# Patient Record
Sex: MALE | Race: WHITE | NOT HISPANIC OR LATINO | Employment: FULL TIME | URBAN - METROPOLITAN AREA
[De-identification: names, ages, dates, MRNs, and addresses within clinical notes are randomized per-mention and may not be internally consistent; named-entity substitution may affect disease eponyms.]

---

## 2021-01-15 ENCOUNTER — OFFICE VISIT (OUTPATIENT)
Dept: URGENT CARE | Facility: CLINIC | Age: 46
End: 2021-01-15

## 2021-01-15 VITALS
HEART RATE: 106 BPM | TEMPERATURE: 97.8 F | OXYGEN SATURATION: 97 % | DIASTOLIC BLOOD PRESSURE: 82 MMHG | RESPIRATION RATE: 16 BRPM | SYSTOLIC BLOOD PRESSURE: 110 MMHG | HEIGHT: 70 IN | WEIGHT: 240 LBS | BODY MASS INDEX: 34.36 KG/M2

## 2021-01-15 DIAGNOSIS — E11.65 UNCONTROLLED TYPE 2 DIABETES MELLITUS WITH HYPERGLYCEMIA (HCC): ICD-10-CM

## 2021-01-15 DIAGNOSIS — R73.9 HYPERGLYCEMIA: ICD-10-CM

## 2021-01-15 DIAGNOSIS — R11.0 NAUSEA: ICD-10-CM

## 2021-01-15 LAB
APPEARANCE UR: CLEAR
BILIRUB UR STRIP-MCNC: NORMAL MG/DL
COLOR UR AUTO: NORMAL
GLUCOSE BLD-MCNC: 296 MG/DL (ref 70–100)
GLUCOSE UR STRIP.AUTO-MCNC: 500 MG/DL
KETONES UR STRIP.AUTO-MCNC: NORMAL MG/DL
LEUKOCYTE ESTERASE UR QL STRIP.AUTO: NORMAL
NITRITE UR QL STRIP.AUTO: NORMAL
PH UR STRIP.AUTO: 5 [PH] (ref 5–8)
PROT UR QL STRIP: NORMAL MG/DL
RBC UR QL AUTO: NORMAL
SP GR UR STRIP.AUTO: >=1.03
UROBILINOGEN UR STRIP-MCNC: 4 MG/DL

## 2021-01-15 PROCEDURE — 82962 GLUCOSE BLOOD TEST: CPT | Performed by: PHYSICIAN ASSISTANT

## 2021-01-15 PROCEDURE — 81002 URINALYSIS NONAUTO W/O SCOPE: CPT | Performed by: PHYSICIAN ASSISTANT

## 2021-01-15 PROCEDURE — 99203 OFFICE O/P NEW LOW 30 MIN: CPT | Performed by: PHYSICIAN ASSISTANT

## 2021-01-15 RX ORDER — ONDANSETRON 4 MG/1
4 TABLET, ORALLY DISINTEGRATING ORAL ONCE
Status: COMPLETED | OUTPATIENT
Start: 2021-01-15 | End: 2021-01-15

## 2021-01-15 RX ORDER — LISINOPRIL 20 MG/1
20 TABLET ORAL DAILY
COMMUNITY

## 2021-01-15 RX ORDER — GLIPIZIDE 10 MG/1
10 TABLET ORAL 2 TIMES DAILY
COMMUNITY

## 2021-01-15 RX ADMIN — ONDANSETRON 4 MG: 4 TABLET, ORALLY DISINTEGRATING ORAL at 12:20

## 2021-01-15 ASSESSMENT — ENCOUNTER SYMPTOMS
NAUSEA: 1
DIARRHEA: 0
CHILLS: 0
HEADACHES: 1
SHORTNESS OF BREATH: 0
COUGH: 0
CHANGE IN BOWEL HABIT: 0
ABDOMINAL PAIN: 0
DIZZINESS: 0
FEVER: 0
FATIGUE: 1
VOMITING: 1
MYALGIAS: 1

## 2021-01-16 NOTE — PROGRESS NOTES
"Subjective:      Matty Smallwood is a 45 y.o. male who presents with Nausea (Frequent nausea, pt states Sx started appriximately 3 weeks ago. )            Nausea  This is a new problem. The current episode started in the past 7 days. Associated symptoms include fatigue, headaches, myalgias, nausea and vomiting. Pertinent negatives include no abdominal pain, change in bowel habit, chest pain, chills, congestion, coughing, fever, rash or urinary symptoms. Nothing aggravates the symptoms. He has tried nothing for the symptoms.     Patient presents to urgent care reporting a 3 week history of nausea, vomiting, fatigue, and headaches. No fevers, chills, body aches, abdominal pain, constipation, diarrhea, or urinary symptoms. PMH is significant for diabetes, currently taking glipizide 20 mg BID and pioglitazone 15 mg daily. He states he is usually compliant with taking the medications, but hasn't taken either medication today.       Review of Systems   Constitutional: Positive for fatigue. Negative for chills and fever.   HENT: Negative for congestion.    Respiratory: Negative for cough and shortness of breath.    Cardiovascular: Negative for chest pain.   Gastrointestinal: Positive for nausea and vomiting. Negative for abdominal pain, change in bowel habit and diarrhea.   Genitourinary: Negative.    Musculoskeletal: Positive for myalgias.   Skin: Negative for rash.   Neurological: Positive for headaches. Negative for dizziness.        Objective:     /82   Pulse (!) 106   Temp 36.6 °C (97.8 °F) (Temporal)   Resp 16   Ht 1.778 m (5' 10\")   Wt 108.9 kg (240 lb)   SpO2 97%   BMI 34.44 kg/m²      Physical Exam  Vitals signs and nursing note reviewed.   Constitutional:       Appearance: Normal appearance. He is well-developed.   HENT:      Head: Normocephalic and atraumatic.      Right Ear: External ear normal.      Left Ear: External ear normal.   Eyes:      Conjunctiva/sclera: Conjunctivae normal.   Neck: "      Musculoskeletal: Normal range of motion.   Cardiovascular:      Rate and Rhythm: Normal rate and regular rhythm.      Heart sounds: Normal heart sounds. No murmur.   Pulmonary:      Effort: Pulmonary effort is normal.      Breath sounds: Normal breath sounds. No wheezing or rales.   Abdominal:      General: Abdomen is flat. Bowel sounds are normal. There is no distension.      Tenderness: There is no abdominal tenderness. There is no right CVA tenderness, left CVA tenderness or guarding.   Musculoskeletal: Normal range of motion.   Skin:     General: Skin is warm and dry.   Neurological:      Mental Status: He is alert and oriented to person, place, and time.   Psychiatric:         Behavior: Behavior normal.          PMH:  has no past medical history on file.  MEDS:   Current Outpatient Medications:   •  lisinopril (PRINIVIL) 20 MG Tab, Take 20 mg by mouth every day., Disp: , Rfl:   •  glipiZIDE (GLUCOTROL) 10 MG Tab, Take 10 mg by mouth 2 times a day., Disp: , Rfl:   •  HYDROCHLOROTHIAZIDE PO, Take  by mouth., Disp: , Rfl:     Current Facility-Administered Medications:   •  ondansetron (ZOFRAN ODT) dispertab 4 mg, 4 mg, Oral, Once, GIANNA NicoleASTEPHANE.  ALLERGIES: No Known Allergies  SURGHX: History reviewed. No pertinent surgical history.  SOCHX:  reports that he has never smoked. He has never used smokeless tobacco. He reports previous alcohol use. He reports previous drug use.  FH: family history is not on file.       POCT Urinalysis:  Ref Range & Units 11:57 AM    POC Color Negative BROWN    POC Appearance Negative CLEAR    POC Leukocyte Esterase Negative NEG    POC Nitrites Negative NEG    POC Urobiligen Negative (0.2) mg/dL 4.0    POC Protein Negative mg/dL NEG    POC Urine PH 5.0 - 8.0 5.0    POC Blood Negative NEG    POC Specific Gravity <1.005 - >1.030 >=1.030    POC Ketones Negative mg/dL NEG    POC Bilirubin Negative mg/dL MOD    POC Glucose Negative mg/dL 500        Assessment/Plan:        1.  Uncontrolled type 2 diabetes mellitus with hyperglycemia (HCC)    - CBC WITH DIFFERENTIAL; Future  - Comp Metabolic Panel; Future  - LIPASE; Future  - HEMOGLOBIN A1C; Future    2. Nausea    - POCT Glucose --> 296 (fasting)   - POCT Urinalysis --> + glucose, + blood , negative ketones   - ondansetron (ZOFRAN ODT) dispertab 4 mg   - Given in urgent care with good relief of nausea       Will obtain blood work at today's visit. Encouraged increased fluids and avoidance of carbohydrates and sugars. Advised to increased daily dose of pioglitazone to 30 mg daily. He will follow up with primary care as soon as possible for ongoing management of uncontrolled type 2 diabetes. ED precautions discussed at length. The patient demonstrated a good understanding and agreed with the treatment plan.

## 2022-04-07 ENCOUNTER — HOSPITAL ENCOUNTER
Age: 47
End: 2022-04-07
Payer: COMMERCIAL

## 2022-04-07 DIAGNOSIS — B19.10: Primary | ICD-10-CM

## 2022-04-07 PROCEDURE — 86803 HEPATITIS C AB TEST: CPT

## 2022-04-07 PROCEDURE — 80053 COMPREHEN METABOLIC PANEL: CPT

## 2022-04-07 PROCEDURE — 87517 HEPATITIS B DNA QUANT: CPT

## 2022-04-07 PROCEDURE — 86709 HEPATITIS A IGM ANTIBODY: CPT

## 2022-04-07 PROCEDURE — 86706 HEP B SURFACE ANTIBODY: CPT

## 2022-04-07 PROCEDURE — 87340 HEPATITIS B SURFACE AG IA: CPT

## 2022-04-07 PROCEDURE — 86705 HEP B CORE ANTIBODY IGM: CPT

## 2022-09-01 ENCOUNTER — HOSPITAL ENCOUNTER (OUTPATIENT)
Dept: HOSPITAL 98 - LAB | Age: 47
Discharge: HOME | End: 2022-09-01
Payer: COMMERCIAL

## 2022-09-01 DIAGNOSIS — B19.10: Primary | ICD-10-CM

## 2022-09-01 LAB
ALANINE AMINOTRANSFERASE: 82 U/L (ref 0–41)
ALBUMIN LEVEL: 3.9 G/DL (ref 3.5–5.2)
ALKALINE PHOSPHATASE: 93 U/L (ref 40–130)
ANION GAP: 13 (ref 5–19)
ASPARTATE AMINO TRANSFERASE: 60 U/L (ref 0–40)
BLOOD UREA NITROGEN: 16 MG/DL (ref 6–20)
CALCIUM: 9 MG/DL (ref 8.5–10.5)
CARBON DIOXIDE: 25 MMOL/L (ref 22–29)
CHLORIDE: 107 MMOL/L (ref 98–107)
GLOBULIN: 3.2 G/DL (ref 1.3–4.6)
GLUCOSE: 81 MG/DL (ref 65–115)
HCO3 BLD-SCNC: 25 MMOL/L (ref 22–29)
OSMOLALITY CALCULATED: 292 MOSM/KG (ref 285–295)
POTASSIUM: 4 MMOL/L (ref 3.5–5.1)
SODIUM BLD-SCNC: 141 MMOL/L (ref 136–145)
SODIUM: 141 MMOL/L (ref 136–145)
TOTAL PROTEIN: 7.1 G/DL (ref 6.6–8.7)

## 2022-09-01 PROCEDURE — 87517 HEPATITIS B DNA QUANT: CPT

## 2022-09-01 PROCEDURE — 80053 COMPREHEN METABOLIC PANEL: CPT

## 2022-09-02 LAB — HEPATITIS B VIRUS DNA PCR: 4.34 LOG IU/ML

## 2024-02-14 ENCOUNTER — HOSPITAL ENCOUNTER
Age: 49
End: 2024-02-14
Payer: COMMERCIAL

## 2024-02-14 DIAGNOSIS — S46.012A: ICD-10-CM
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
## 2024-02-14 DIAGNOSIS — S49.90XA: ICD-10-CM
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
## 2024-02-14 DIAGNOSIS — M19.012: ICD-10-CM

## 2024-02-14 DIAGNOSIS — G89.11: ICD-10-CM

## 2024-02-14 PROCEDURE — 80053 COMPREHEN METABOLIC PANEL: CPT

## 2024-02-14 PROCEDURE — 73030 X-RAY EXAM OF SHOULDER: CPT

## 2024-02-14 PROCEDURE — 85025 COMPLETE CBC W/AUTO DIFF WBC: CPT

## 2024-02-14 PROCEDURE — 83036 HEMOGLOBIN GLYCOSYLATED A1C: CPT
